# Patient Record
Sex: FEMALE | Race: WHITE
[De-identification: names, ages, dates, MRNs, and addresses within clinical notes are randomized per-mention and may not be internally consistent; named-entity substitution may affect disease eponyms.]

---

## 2017-12-18 ENCOUNTER — HOSPITAL ENCOUNTER (OUTPATIENT)
Dept: HOSPITAL 10 - SUR | Age: 47
Setting detail: OBSERVATION
LOS: 1 days | Discharge: HOME | End: 2017-12-19
Attending: ORTHOPAEDIC SURGERY | Admitting: ORTHOPAEDIC SURGERY
Payer: COMMERCIAL

## 2017-12-18 VITALS — RESPIRATION RATE: 18 BRPM | SYSTOLIC BLOOD PRESSURE: 145 MMHG | DIASTOLIC BLOOD PRESSURE: 84 MMHG | HEART RATE: 94 BPM

## 2017-12-18 VITALS — SYSTOLIC BLOOD PRESSURE: 137 MMHG | HEART RATE: 92 BPM | RESPIRATION RATE: 16 BRPM | DIASTOLIC BLOOD PRESSURE: 77 MMHG

## 2017-12-18 VITALS — SYSTOLIC BLOOD PRESSURE: 147 MMHG | DIASTOLIC BLOOD PRESSURE: 91 MMHG | RESPIRATION RATE: 20 BRPM

## 2017-12-18 VITALS — HEART RATE: 96 BPM | RESPIRATION RATE: 19 BRPM | SYSTOLIC BLOOD PRESSURE: 114 MMHG | DIASTOLIC BLOOD PRESSURE: 63 MMHG

## 2017-12-18 VITALS — HEART RATE: 104 BPM | RESPIRATION RATE: 16 BRPM | SYSTOLIC BLOOD PRESSURE: 166 MMHG | DIASTOLIC BLOOD PRESSURE: 82 MMHG

## 2017-12-18 VITALS — DIASTOLIC BLOOD PRESSURE: 83 MMHG | SYSTOLIC BLOOD PRESSURE: 141 MMHG | RESPIRATION RATE: 18 BRPM

## 2017-12-18 VITALS — SYSTOLIC BLOOD PRESSURE: 192 MMHG | RESPIRATION RATE: 16 BRPM | DIASTOLIC BLOOD PRESSURE: 113 MMHG | HEART RATE: 104 BPM

## 2017-12-18 VITALS — RESPIRATION RATE: 16 BRPM | DIASTOLIC BLOOD PRESSURE: 83 MMHG | HEART RATE: 92 BPM | SYSTOLIC BLOOD PRESSURE: 148 MMHG

## 2017-12-18 VITALS — SYSTOLIC BLOOD PRESSURE: 123 MMHG | HEART RATE: 92 BPM | RESPIRATION RATE: 14 BRPM | DIASTOLIC BLOOD PRESSURE: 93 MMHG

## 2017-12-18 VITALS
HEIGHT: 61 IN | HEIGHT: 61 IN | WEIGHT: 209.44 LBS | BODY MASS INDEX: 39.54 KG/M2 | BODY MASS INDEX: 39.54 KG/M2 | WEIGHT: 209.44 LBS

## 2017-12-18 VITALS — RESPIRATION RATE: 15 BRPM | HEART RATE: 94 BPM | DIASTOLIC BLOOD PRESSURE: 87 MMHG | SYSTOLIC BLOOD PRESSURE: 140 MMHG

## 2017-12-18 VITALS — RESPIRATION RATE: 20 BRPM | DIASTOLIC BLOOD PRESSURE: 72 MMHG | SYSTOLIC BLOOD PRESSURE: 124 MMHG

## 2017-12-18 VITALS — SYSTOLIC BLOOD PRESSURE: 152 MMHG | HEART RATE: 90 BPM | RESPIRATION RATE: 16 BRPM | DIASTOLIC BLOOD PRESSURE: 86 MMHG

## 2017-12-18 VITALS — HEART RATE: 94 BPM | RESPIRATION RATE: 17 BRPM | DIASTOLIC BLOOD PRESSURE: 89 MMHG | SYSTOLIC BLOOD PRESSURE: 134 MMHG

## 2017-12-18 VITALS — HEART RATE: 92 BPM | DIASTOLIC BLOOD PRESSURE: 70 MMHG | RESPIRATION RATE: 17 BRPM | SYSTOLIC BLOOD PRESSURE: 123 MMHG

## 2017-12-18 VITALS — HEART RATE: 92 BPM | RESPIRATION RATE: 18 BRPM | DIASTOLIC BLOOD PRESSURE: 83 MMHG | SYSTOLIC BLOOD PRESSURE: 138 MMHG

## 2017-12-18 VITALS — DIASTOLIC BLOOD PRESSURE: 78 MMHG | RESPIRATION RATE: 18 BRPM | SYSTOLIC BLOOD PRESSURE: 135 MMHG

## 2017-12-18 VITALS — HEART RATE: 96 BPM | RESPIRATION RATE: 20 BRPM | SYSTOLIC BLOOD PRESSURE: 129 MMHG | DIASTOLIC BLOOD PRESSURE: 83 MMHG

## 2017-12-18 VITALS — DIASTOLIC BLOOD PRESSURE: 107 MMHG | SYSTOLIC BLOOD PRESSURE: 204 MMHG | HEART RATE: 102 BPM

## 2017-12-18 VITALS — SYSTOLIC BLOOD PRESSURE: 141 MMHG | HEART RATE: 94 BPM | RESPIRATION RATE: 17 BRPM | DIASTOLIC BLOOD PRESSURE: 95 MMHG

## 2017-12-18 DIAGNOSIS — M51.17: Primary | ICD-10-CM

## 2017-12-18 PROCEDURE — G0378 HOSPITAL OBSERVATION PER HR: HCPCS

## 2017-12-18 PROCEDURE — 88304 TISSUE EXAM BY PATHOLOGIST: CPT

## 2017-12-18 PROCEDURE — 97116 GAIT TRAINING THERAPY: CPT

## 2017-12-18 PROCEDURE — 72100 X-RAY EXAM L-S SPINE 2/3 VWS: CPT

## 2017-12-18 PROCEDURE — 97530 THERAPEUTIC ACTIVITIES: CPT

## 2017-12-18 PROCEDURE — 97163 PT EVAL HIGH COMPLEX 45 MIN: CPT

## 2017-12-18 PROCEDURE — 87086 URINE CULTURE/COLONY COUNT: CPT

## 2017-12-18 RX ADMIN — HYDROCODONE BITARTRATE AND ACETAMINOPHEN PRN TAB: 5; 325 TABLET ORAL at 20:42

## 2017-12-18 RX ADMIN — THROMBIN TOPICAL RECOMBINANT ONE UNITS: KIT at 10:51

## 2017-12-18 RX ADMIN — THROMBIN TOPICAL RECOMBINANT ONE UNITS: KIT at 08:23

## 2017-12-18 RX ADMIN — CEFAZOLIN SCH MLS/HR: 1 INJECTION, POWDER, FOR SOLUTION INTRAMUSCULAR; INTRAVENOUS at 18:02

## 2017-12-18 RX ADMIN — HYDROCODONE BITARTRATE AND ACETAMINOPHEN PRN TAB: 5; 325 TABLET ORAL at 16:17

## 2017-12-18 RX ADMIN — CEFAZOLIN SCH MLS/HR: 1 INJECTION, POWDER, FOR SOLUTION INTRAMUSCULAR; INTRAVENOUS at 12:28

## 2017-12-18 NOTE — HPN
Date/Time of Note


Date/Time of Note


DATE: 12/18/17 


TIME: 07:00





Interval H&P Admission Note


Pt. seen H&P reviewed:  No system changes











KELLY MARTIN MD Dec 18, 2017 07:00

## 2017-12-18 NOTE — RADRPT
PROCEDURE:   Intraoperative fluoroscopy.

 

CLINICAL INDICATION:   Intraoperative fluoroscopy. 

 

TECHNIQUE:   Intraoperative fluoroscopy of the lumbar spine was performed.  

 

COMPARISON:   None.

 

FINDINGS:

 

Intraoperative fluoroscopy of the lumbar spine was performed.  

 

Fluoroscopy time: 10.2 min 

 

Images: 2

 

IMPRESSION:

 

As above.

 

RPTAT: HVF

_____________________________________________ 

.Geovani Menchaca MD, MD           Date    Time 

Electronically viewed and signed by .Geovani Menchaca MD, MD on 12/18/2017 13:21 

 

D:  12/18/2017 13:21  T:  12/18/2017 13:21

.F/

## 2017-12-18 NOTE — OPR
Date/Time of Note


Date/Time of Note


DATE: 12/18/17 


TIME: 11:44





Operative Report


Free Text/Dictation


DATE OF OPERATION: 12/18/2017





PREOPERATIVE DIAGNOSES: Left L5-S1 disk herniation with radiculopathy 





POSTOPERATIVE DIAGNOSES: Left L5-S1 disk herniation with radiculopathy 





OPERATION PERFORMED: 


1. Left L5-S1 microdiscectomy


2. Left L4-5 partial laminotomy and medial facetectomy 





SURGEON: Kelly Martin MD 





ASSISTANT: Filemon Ma MD 





ANESTHESIA: General endotracheal 





ESTIMATED BLOOD LOSS: Minimal 





SURGICAL INDICATION: The patient is a 47 year-old woman who presents with a 

history of left lower extremity pain and weakness. She was found to have a disc 

herniation which correlated well with their symptoms. The patient had failed 

conservative treatment. Risks, benefits, and alternatives to microdiscectomy 

were explained to the patient and they wished to proceed. 


Risks explained included but were not exclusive of bleeding, infection, cauda 

equina syndrome, nerve injury, dural tear, iatrogenic instability, recurrent 

disc herniation, fracture, vascular injury, bowel injury, stroke, heart attack 

and pulmonary embolism.


 


DESCRIPTION OF TECHNIQUE: The patient was identified in the preoperative area 

and taken to the operating room. Rapid induction of general endotracheal 

anesthesia was performed. The patient was given 2 g of cefazolin for 

prophylaxis. The patient was then placed in the prone position on a Toby 

frame on a Brady table with all bony prominences well padded. The back was 

prepped and draped in the usual sterile manner. 





Using a spinal needle and intraoperative fluoroscopy, the appropriate level was 

clearly identified. The skin was injected using 0.25% Marcaine with 

epinephrine. Longitudinal midline incision was then created using a 10 blade. 

Further dissection through soft tissue was performed using electrocautery down 

to the spinous processes. The dissection was taken down the left side of the 

lamina and over the facet joint capsule. A self-retaining retractor was 

applied. Again, intraoperative fluoroscopy confirmed the appropriate level. 





The microscope was brought into use for microdissection. A small portion of the 

caudal aspect of the cephalad lamina and L5-S1 medial facet was resected using 

a high-speed bur. A series of Kerrison rongeurs were then used to resect the 

ligamentum flavum. The dura and S1 traversing nerve root were both directly 

visualized. These were retracted gently in a medial direction. Immediately, the 

extruded disc fragment was noted. The pseudo anulus was incised using an 11 

blade. Several loose fragments of disk were removed. These were removed back to 

a stable portion of the disk. The disk space was further pressurized using a 

using normal saline through a syringe to ensure that no loose fragments 

remained behind. Palpation with a ball-tip probe did not reveal any further 

stenosis. The traversing S1 nerve root was noted to be significantly 

decompressed. 





During this procedure it was noted that both left L5 and S1 amplitudes were 

decreased. To ensure thorough neurological decompression and avoid potential 

persistent L5 nerve root compression, it was decided to perform a partial 

laminotomy at the superior aspect of L5 laminae and a partial L4-5 medial 

facetectomy was performed to trace the L5 nerve root. A series of Kerrison 

rongeurs were then used to resect a small portion of the ligamentum flavum. The 

L5 nerve root was noted to be decompressed. At the conclusion of the procedure 

both L5 and S1 amplitudes had returned to normal.





Meticulous attention was paid towards hemostasis using FloSeal as well as 

Gelfoam and thrombin. Care was taken to remove all FloSeal and Gelfoam prior to 

wound closure. Upon completion of the procedure there was a pin hole sized 

anterior S1 nerve root sleeve violation noted. This was noted to be a pin hole 

in size. A piece of duragen and durocele was applied to this region prior to 

closure.





The fascia was then closed using 1 Vicryl in an interrupted fashion. 

Subcutaneous tissue was closed using 2-0 Vicryl in an interrupted fashion. The 

skin was closed using a running 4-0 Monocryl stitch. The wound was dressed 

using Dermabond and sterile dressing. 


The patient was returned to the supine position. He was extubated immediately 

postoperatively and taken to the recovery room in stable condition. 


I was physically present and participated in the entire operation from incision 

to closure. 





COMPLICATIONS: anterior S1 nerve sleeve violation.


Procedure Date:  Dec 18, 2017


Preoperative Diagnosis


PREOPERATIVE DIAGNOSES: Left L5-S1 disk herniation with radiculopathy


Postoperative Diagnosis


POSTOPERATIVE DIAGNOSES: Left L5-S1 disk herniation with radiculopathy


Surgeon


see signature line


Assistant


Dr. Ma


Anesthesia Type:  general


Estimated Blood Loss:  10 - 50 ml's


Transfusion


   none


Specimen


L5-S1 disk


Grafts/Implants


none


Complications


none


Pt Condition Post Procedure:  stable


Disposition:  PACU


Procedure Description


DESCRIPTION OF TECHNIQUE: The patient was identified in the preoperative area 

and taken to the operating room. Rapid induction of general endotracheal 

anesthesia was performed. The patient was given 2 g of cefazolin for 

prophylaxis. The patient was then placed in the prone position on a Toby 

frame on a Brady table with all bony prominences well padded. The back was 

prepped and draped in the usual sterile manner. 





Using a spinal needle and intraoperative fluoroscopy, the appropriate level was 

clearly identified. The skin was injected using 0.25% Marcaine with 

epinephrine. Longitudinal midline incision was then created using a 10 blade. 

Further dissection through soft tissue was performed using electrocautery down 

to the spinous processes. The dissection was taken down the left side of the 

lamina and over the facet joint capsule. A self-retaining retractor was 

applied. Again, intraoperative fluoroscopy confirmed the appropriate level. 





The microscope was brought into use for microdissection. A small portion of the 

caudal aspect of the cephalad lamina and L5-S1 medial facet was resected using 

a high-speed bur. A series of Kerrison rongeurs were then used to resect the 

ligamentum flavum. The dura and S1 traversing nerve root were both directly 

visualized. These were retracted gently in a medial direction. Immediately, the 

extruded disc fragment was noted. The pseudo anulus was incised using an 11 

blade. Several loose fragments of disk were removed. These were removed back to 

a stable portion of the disk. The disk space was further pressurized using a 

using normal saline through a syringe to ensure that no loose fragments 

remained behind. Palpation with a ball-tip probe did not reveal any further 

stenosis. The traversing S1 nerve root was noted to be significantly 

decompressed. 





During this procedure it was noted that both left L5 and S1 amplitudes were 

decreased. To ensure thorough neurological decompression and avoid potential 

persistent L5 nerve root compression, it was decided to perform a partial 

laminotomy at the superior aspect of L5 laminae and a partial L4-5 medial 

facetectomy was performed to trace the L5 nerve root. A series of Kerrison 

rongeurs were then used to resect a small portion of the ligamentum flavum. The 

L5 nerve root was noted to be decompressed. At the conclusion of the procedure 

both L5 and S1 amplitudes had returned to normal.





Meticulous attention was paid towards hemostasis using FloSeal as well as 

Gelfoam and thrombin. Care was taken to remove all FloSeal and Gelfoam prior to 

wound closure. Upon completion of the procedure there was a pin hole sized 

anterior S1 nerve root sleeve violation noted. This was noted to be a pin hole 

in size. A piece of duragen and durocele was applied to this region prior to 

closure.





The fascia was then closed using 1 Vicryl in an interrupted fashion. 

Subcutaneous tissue was closed using 2-0 Vicryl in an interrupted fashion. The 

skin was closed using a running 4-0 Monocryl stitch. The wound was dressed 

using Dermabond and sterile dressing. 


The patient was returned to the supine position. He was extubated immediately 

postoperatively and taken to the recovery room in stable condition. 


I was physically present and participated in the entire operation from incision 

to closure.











KELLY MARTIN MD Dec 18, 2017 11:57

## 2017-12-19 VITALS — SYSTOLIC BLOOD PRESSURE: 129 MMHG | RESPIRATION RATE: 18 BRPM | DIASTOLIC BLOOD PRESSURE: 71 MMHG

## 2017-12-19 VITALS — DIASTOLIC BLOOD PRESSURE: 71 MMHG | RESPIRATION RATE: 18 BRPM | SYSTOLIC BLOOD PRESSURE: 148 MMHG

## 2017-12-19 RX ADMIN — CEFAZOLIN SCH MLS/HR: 1 INJECTION, POWDER, FOR SOLUTION INTRAMUSCULAR; INTRAVENOUS at 00:16

## 2017-12-19 RX ADMIN — HYDROCODONE BITARTRATE AND ACETAMINOPHEN PRN TAB: 5; 325 TABLET ORAL at 14:01

## 2017-12-19 RX ADMIN — HYDROCODONE BITARTRATE AND ACETAMINOPHEN PRN TAB: 5; 325 TABLET ORAL at 00:20

## 2017-12-19 RX ADMIN — CEFAZOLIN SCH MLS/HR: 1 INJECTION, POWDER, FOR SOLUTION INTRAMUSCULAR; INTRAVENOUS at 06:08

## 2017-12-19 RX ADMIN — HYDROCODONE BITARTRATE AND ACETAMINOPHEN PRN TAB: 5; 325 TABLET ORAL at 09:05

## 2017-12-19 NOTE — PDOCDIS
Discharge Instructions


CONDITION


Patient Condition:  Good





HOME CARE INSTRUCTIONS:


Diet Instructions:  Regular





ACTIVITY:








Activity Restrictions:   Slowly Increase Activity





 Rest between Activity





 Avoid heavy lifting





 Do not operate Machinery





 Do not operate Power Tool





 Avoid Heavy Housework





Bathing Restrictions:  Shower





FOLLOW UP/APPOINTMENTS


Follow-up Plan


Follow-up with Dr. Martin in 2 weeks











KELLY MARTIN MD Dec 19, 2017 12:21

## 2019-02-28 ENCOUNTER — HOSPITAL ENCOUNTER (OUTPATIENT)
Dept: HOSPITAL 10 - GIL | Age: 49
Discharge: HOME | End: 2019-02-28
Attending: INTERNAL MEDICINE
Payer: COMMERCIAL

## 2019-02-28 ENCOUNTER — HOSPITAL ENCOUNTER (OUTPATIENT)
Dept: HOSPITAL 91 - GIL | Age: 49
Discharge: HOME | End: 2019-02-28
Payer: COMMERCIAL

## 2019-02-28 VITALS — DIASTOLIC BLOOD PRESSURE: 78 MMHG | RESPIRATION RATE: 20 BRPM | SYSTOLIC BLOOD PRESSURE: 149 MMHG

## 2019-02-28 VITALS
BODY MASS INDEX: 38.71 KG/M2 | WEIGHT: 205.03 LBS | BODY MASS INDEX: 38.71 KG/M2 | HEIGHT: 61 IN | WEIGHT: 205.03 LBS | HEIGHT: 61 IN

## 2019-02-28 VITALS — DIASTOLIC BLOOD PRESSURE: 90 MMHG | RESPIRATION RATE: 22 BRPM | SYSTOLIC BLOOD PRESSURE: 183 MMHG | HEART RATE: 75 BPM

## 2019-02-28 DIAGNOSIS — K64.8: ICD-10-CM

## 2019-02-28 DIAGNOSIS — D12.5: ICD-10-CM

## 2019-02-28 DIAGNOSIS — R19.4: Primary | ICD-10-CM

## 2019-02-28 PROCEDURE — 45380 COLONOSCOPY AND BIOPSY: CPT

## 2019-02-28 PROCEDURE — 88305 TISSUE EXAM BY PATHOLOGIST: CPT

## 2019-02-28 NOTE — PAC
Date/Time of Note


Date/Time of Note


DATE: 2/28/19 


TIME: 11:13





Post-Anesthesia Notes


Post-Anesthesia Note


Last documented vital signs


1114  135/69 65 18 100%


Activity:  WNL


Respiratory function:  WNL


Cardiovascular function:  WNL


Mental status:  Baseline


Pain reasonably controlled:  Yes


Hydration appropriate:  Yes


Nausea/Vomiting absent:  Yes











CHANG MOSES DO             Feb 28, 2019 11:14

## 2019-02-28 NOTE — PREAC
Date/Time of Note


Date/Time of Note


DATE: 2/28/19 


TIME: 10:22





Anesthesia Eval and Record


Evaluation


Time Pre-Procedure Interview


DATE: 2/28/19 


TIME: 10:22


Age


48


Sex


female


NPO:  8 hrs


Preoperative diagnosis


hx of colon cancer


Planned procedure


screening





Past Medical History


Past Medical History:  None





Surgery & Anesthesia Issues


No known issue





Meds


Anticoagulation:  No


Beta Blocker within 24 hr:  No


Reason Beta Blocker not given:  Pt. not on B-Blocker


Reported Medications


[None]   No Conflict Check


   2/28/19


Discontinued Reported Medications


Gabapentin* (Gabapentin*) 100 Mg Capsule, 200 MG PO TID, #180 CAP


   12/18/17


Acetaminophen* (Tylophen*) 500 Mg Capsule, 500 MG PO Q6H PRN for PAIN, TAB


   12/18/17


Meds reviewed:  Yes





Allergies


Coded Allergies:  


     No Known Allergy (Unverified , 12/18/17)


Allergies Reviewed:  Yes





Labs/Studies


Labs Reviewed:  Reviewed by anesthesiologist


Pregnancy test:  N/A





Pre-procedure Exam


Airway:  Adequate mouth opening, Adequate thyromental dist


Mallampati:  Mallampati III


Teeth:  Normal


Lung:  Normal


Heart:  Normal





ASA Physical Status


ASA physical status:  1


Emergency:  None





Pre-operative Attestations


Prior to commencing anesthesia and surgery, the patient was re-evaluated, there 


was verification of:


*The patient's identity


*The results of appropriate recent lab work and preoperative vital signs


*The above evaluation not changing prior to induction


*Anesthetic plan, risk benefits, alternative and complications discussed with 


patient/family; questions answered; patient/family understands, accepts and 


wishes to proceed.











CHANG MOSES DO             Feb 28, 2019 10:23